# Patient Record
Sex: MALE | Race: WHITE
[De-identification: names, ages, dates, MRNs, and addresses within clinical notes are randomized per-mention and may not be internally consistent; named-entity substitution may affect disease eponyms.]

---

## 2018-03-04 ENCOUNTER — HOSPITAL ENCOUNTER (EMERGENCY)
Dept: HOSPITAL 45 - C.EDB | Age: 28
LOS: 1 days | Discharge: HOME | End: 2018-03-05
Payer: COMMERCIAL

## 2018-03-04 VITALS
BODY MASS INDEX: 27.06 KG/M2 | WEIGHT: 158.51 LBS | BODY MASS INDEX: 27.06 KG/M2 | HEIGHT: 64.02 IN | HEIGHT: 64.02 IN | WEIGHT: 158.51 LBS

## 2018-03-04 VITALS — TEMPERATURE: 98.42 F

## 2018-03-04 DIAGNOSIS — Z82.49: ICD-10-CM

## 2018-03-04 DIAGNOSIS — F41.9: Primary | ICD-10-CM

## 2018-03-04 DIAGNOSIS — F32.9: ICD-10-CM

## 2018-03-04 DIAGNOSIS — Z88.1: ICD-10-CM

## 2018-03-04 LAB
ALBUMIN SERPL-MCNC: 4.1 GM/DL (ref 3.4–5)
ALP SERPL-CCNC: 88 U/L (ref 45–117)
ALT SERPL-CCNC: 28 U/L (ref 12–78)
AST SERPL-CCNC: 19 U/L (ref 15–37)
BASOPHILS # BLD: 0.03 K/UL (ref 0–0.2)
BASOPHILS NFR BLD: 0.3 %
BUN SERPL-MCNC: 19 MG/DL (ref 7–18)
CALCIUM SERPL-MCNC: 8.7 MG/DL (ref 8.5–10.1)
CO2 SERPL-SCNC: 29 MMOL/L (ref 21–32)
CREAT SERPL-MCNC: 1.07 MG/DL (ref 0.6–1.4)
EOS ABS #: 0.07 K/UL (ref 0–0.5)
EOSINOPHIL NFR BLD AUTO: 279 K/UL (ref 130–400)
GLUCOSE SERPL-MCNC: 86 MG/DL (ref 70–99)
HCT VFR BLD CALC: 51 % (ref 42–52)
HGB BLD-MCNC: 17.7 G/DL (ref 14–18)
IG#: 0.17 K/UL (ref 0–0.02)
IMM GRANULOCYTES NFR BLD AUTO: 30.2 %
LYMPHOCYTES # BLD: 2.81 K/UL (ref 1.2–3.4)
MCH RBC QN AUTO: 29.8 PG (ref 25–34)
MCHC RBC AUTO-ENTMCNC: 34.7 G/DL (ref 32–36)
MCV RBC AUTO: 86 FL (ref 80–100)
MONO ABS #: 0.92 K/UL (ref 0.11–0.59)
MONOCYTES NFR BLD: 9.9 %
NEUT ABS #: 5.32 K/UL (ref 1.4–6.5)
NEUTROPHILS # BLD AUTO: 0.8 %
NEUTROPHILS NFR BLD AUTO: 57 %
PMV BLD AUTO: 8.8 FL (ref 7.4–10.4)
POTASSIUM SERPL-SCNC: 3 MMOL/L (ref 3.5–5.1)
PROT SERPL-MCNC: 8.2 GM/DL (ref 6.4–8.2)
RED CELL DISTRIBUTION WIDTH CV: 13.5 % (ref 11.5–14.5)
RED CELL DISTRIBUTION WIDTH SD: 42.2 FL (ref 36.4–46.3)
SODIUM SERPL-SCNC: 137 MMOL/L (ref 136–145)
WBC # BLD AUTO: 9.32 K/UL (ref 4.8–10.8)

## 2018-03-04 NOTE — EMERGENCY ROOM VISIT NOTE
History


Report prepared by Noe:  Gustabo Young


Under the Supervision of:  Dr. Radha Epps D.O.


First contact with patient:  22:51


Chief Complaint:  MENTAL HEALTH EVALUATION


Stated Complaint:  MENTAL HEALTH EVAL





History of Present Illness


The patient is a 28 year old male who presents to the Emergency Room with 

complaints of an episode of possible suicidal ideations occurring within the 

last two weeks. The patient states that he recently stopped taking his Seroquel 

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 He notes that he 

completely stopped taking his medication because they make him sleep too much 

and feel lethargic throughout the day, preventing him from doing his work. He 

reports that since he stopped taking his medication, he has had trouble 

sleeping. The patient states that he is in the emergency department tonight 

because his friends believed that he was suicidal. He notes that he is not 

suicidal, but reports that he is "isolating himself" because he does not feel 

like he is a positive influence in other's lives. He reports that he feels as 

though his friends feel that he is a positive influence, but states that what 

his friends think is not important because he does not feel the same. He notes 

that he does not have any current suicidal ideations, but reports that he has 

had suicidal ideations in the past. The patient states that a few years ago, he 

had suicidal ideations and held a knife to his wrist. He notes that he had two 

other suicidal ideations when he was in 7th grade where he held a knife to his 

throat. He denies having any homicidal ideations and hallucinations as well as 

headache, change in his bowel movements, as well as any other physical 

concerns. He reports that he has a therapist and is in group therapy, which has 

mildly helped. The patient states that he had an ear infection last month that 

has resolved.





   Source of History:  patient


   Onset:  within the last two weeks


   Position:  other (global)


   Quality:  other (possible suicidal ideations)


   Timing:  other (an episode)


   Associated Symptoms:  No headache


Note:


The patient states that he is "isolating himself." He  denies any current 

suicidal ideations, homicidal ideations, and hallucinations. He also denies any 

change in his bowel movements.





Review of Systems


See HPI for pertinent positives & negatives. A total of 10 systems reviewed and 

were otherwise negative.





Past Medical & Surgical


Medical Problems:


(1) Depression








Family History





Heart disease


Hypertension





Social History


Smoking Status:  Never Smoker


Alcohol Use:  none


Drug Use:  none


Marital Status:  single


Housing Status:  lives alone


Occupation Status:  [employer] student





Current/Historical Medications


Scheduled


Levothyroxine Sodium (Levothyroxine Sodium), 75 MCG PO QAM


[place] Carbonate (Lithium Carbonate), 900 MG PO HS


Quetiapine Fumarate (Seroquel), 50 MG PO HS





Allergies


Coded Allergies:  


     Cefaclor (Verified  Allergy, Unknown, vomiting, 3/4/18)





Physical Exam


Vital Signs











  Date Time  Temp Pulse Resp B/P (MAP) Pulse Ox O2 Delivery O2 Flow Rate FiO2


 


3/5/18 01:41  62 16 144/93 98 Room Air  


 


3/4/18 22:14 36.9 74 18 153/104 99 Room Air  











Physical Exam


GENERAL: alert, well appearing, well nourished, no distress, non-toxic 


EYE EXAM: normal conjunctiva, PERRL and EOM's grossly intact


OROPHARYNX: no exudate, no erythema, lips, buccal mucosa, and tongue normal and 

mucous membranes are moist


NECK: supple, no nuchal rigidity, no adenopathy, non-tender


LUNGS: Clear to auscultation. Normal chest wall mechanics


HEART: no murmurs, S1 normal and S2 normal 


ABDOMEN: abdomen soft, non-tender, normo-active bowel sounds, no masses, no 

rebound or guarding. 


BACK: Back is symmetrical on inspection and there is no deformity, no midline 

tenderness, no CVA tenderness. 


SKIN: no rashes and no bruising 


UPPER EXTREMITIES: upper extremities are grossly normal. 


LOWER EXTREMITIES: No pitting edema.


NEURO EXAM: Normal sensorium, cranial nerves II-XII grossly intact, normal 

speech,  no gross weakness of arms, no gross weakness of legs.


PSYCH: No active SI but history of SI, no HI, no hallucinations.





Medical Decision & Procedures


Laboratory Results


3/4/18 22:27








Red Blood Count 5.93, Mean Corpuscular Volume 86.0, Mean Corpuscular Hemoglobin 

29.8, Mean Corpuscular Hemoglobin Concent 34.7, Mean Platelet Volume 8.8, 

Neutrophils (%) (Auto) 57.0, Lymphocytes (%) (Auto) 30.2, Monocytes (%) (Auto) 

9.9, Eosinophils (%) (Auto) 0.8, Basophils (%) (Auto) 0.3, Neutrophils # (Auto) 

5.32, Lymphocytes # (Auto) 2.81, Monocytes # (Auto) 0.92, Eosinophils # (Auto) 

0.07, Basophils # (Auto) 0.03





3/4/18 22:27

















Test


  3/4/18


22:25 3/4/18


22:27


 


Urine Color YELLOW  


 


Urine Appearance CLEAR (CLEAR)  


 


Urine pH 6.0 (4.5-7.5)  


 


Urine Specific Gravity


  1.015


(1.000-1.030) 


 


 


Urine Protein NEG (NEG)  


 


Urine Glucose (UA) NEG (NEG)  


 


Urine Ketones NEG (NEG)  


 


Urine Occult Blood NEG (NEG)  


 


Urine Nitrite NEG (NEG)  


 


Urine Bilirubin NEG (NEG)  


 


Urine Urobilinogen NEG (NEG)  


 


Urine Leukocyte Esterase NEG (NEG)  


 


Urine Opiates Screen NEG (NEG)  


 


Urine Methadone, Qualitative NEG (NEG)  


 


Urine Barbiturates NEG (NEG)  


 


Urine Phencyclidine (PCP)


Level NEG (NEG) 


  


 


 


Ur


Amphetamine/Methamphetamine NEG (NEG) 


  


 


 


MDMA (Ecstasy) Screen NEG (NEG)  


 


Urine Benzodiazepines Screen NEG (NEG)  


 


Urine Cocaine Metabolite NEG (NEG)  


 


Urine Marijuana (THC) NEG (NEG)  


 


White Blood Count


  


  9.32 K/uL


(4.8-10.8)


 


Red Blood Count


  


  5.93 M/uL


(4.7-6.1)


 


Hemoglobin


  


  17.7 g/dL


(14.0-18.0)


 


Hematocrit  51.0 % (42-52) 


 


Mean Corpuscular Volume


  


  86.0 fL


()


 


Mean Corpuscular Hemoglobin


  


  29.8 pg


(25-34)


 


Mean Corpuscular Hemoglobin


Concent 


  34.7 g/dl


(32-36)


 


Platelet Count


  


  279 K/uL


(130-400)


 


Mean Platelet Volume


  


  8.8 fL


(7.4-10.4)


 


Neutrophils (%) (Auto)  57.0 % 


 


Lymphocytes (%) (Auto)  30.2 % 


 


Monocytes (%) (Auto)  9.9 % 


 


Eosinophils (%) (Auto)  0.8 % 


 


Basophils (%) (Auto)  0.3 % 


 


Neutrophils # (Auto)


  


  5.32 K/uL


(1.4-6.5)


 


Lymphocytes # (Auto)


  


  2.81 K/uL


(1.2-3.4)


 


Monocytes # (Auto)


  


  0.92 K/uL


(0.11-0.59)


 


Eosinophils # (Auto)


  


  0.07 K/uL


(0-0.5)


 


Basophils # (Auto)


  


  0.03 K/uL


(0-0.2)


 


RDW Standard Deviation


  


  42.2 fL


(36.4-46.3)


 


RDW Coefficient of Variation


  


  13.5 %


(11.5-14.5)


 


Immature Granulocyte % (Auto)  1.8 % 


 


Immature Granulocyte # (Auto)


  


  0.17 K/uL


(0.00-0.02)


 


Anion Gap


  


  8.0 mmol/L


(3-11)


 


Est Creatinine Clear Calc


Drug Dose 


  92.6 ml/min 


 


 


Estimated GFR (


American) 


  108.1 


 


 


Estimated GFR (Non-


American 


  93.3 


 


 


BUN/Creatinine Ratio  17.7 (10-20) 


 


Calcium Level


  


  8.7 mg/dl


(8.5-10.1)


 


Total Bilirubin


  


  0.3 mg/dl


(0.2-1)


 


Aspartate Amino Transf


(AST/SGOT) 


  19 U/L (15-37) 


 


 


Alanine Aminotransferase


(ALT/SGPT) 


  28 U/L (12-78) 


 


 


Alkaline Phosphatase


  


  88 U/L


()


 


Total Protein


  


  8.2 gm/dl


(6.4-8.2)


 


Albumin


  


  4.1 gm/dl


(3.4-5.0)


 


Globulin


  


  4.1 gm/dl


(2.5-4.0)


 


Albumin/Globulin Ratio  1.0 (0.9-2) 


 


Thyroid Stimulating Hormone


(TSH) 


  4.100 uIu/ml


(0.300-4.500)


 


Salicylates Level


  


  < 1.7 mg/dl


(2.8-20)


 


Acetaminophen Level


  


  < 2 ug/ml


(10-30)


 


Lithium Level


  


  < 0.2 mMOL/L


(0.6-1.2)


 


Ethyl Alcohol mg/dL


  


  < 3.0 mg/dl


(0-3)





Laboratory results per my review.





ED Course


2255: The patient was evaluated in room A6. A complete history and physical 

exam was performed. 





0145: Patient seen and evaluated by Rachna from [facility] S.  Patient does not meet 

inpatient criteria, and no grounds to 302 the patient.  Patient already has a 

group counselor and therapist whom he sees.  Discussed with him concerning 

symptoms, need for close follow-up with his therapist, possible medication 

adjustments after they are reinitiated, symptoms to watch and return for, he 

verbalized understanding was agreeable with plan.  Patient cooperative here 

throughout, I do not feel he is an imminent danger to himself or others at this 

time.





0221: Upon reevaluation, the patient is feeling better. I discussed the 

findings and the treatment plan with the patient.  He verbalizes agreement and 

understanding.  The patient was discharged home.





Medical Decision


Differential diagnosis:





Etiologies such as mood disorder, infection, hypoglycemia, electrolyte 

abnormalities, cardiac sources, intracerebral event, toxicologic, neurologic, 

as well as others were entertained.





Blood Pressure Screening


Patient's blood pressure:  Elevated blood pressure


Blood pressure disposition:  Elevated BP felt to be situational





Impression





 Primary Impression:  


 Acute anxiety


 Additional Impression:  


 Depression





Scribe Attestation


The scribe's documentation has been prepared under my direction and personally 

reviewed by me in its entirety. I confirm that the note above accurately 

reflects all work, treatment, procedures, and medical decision making performed 

by me.





Departure Information


Dispostion


Home / Self-Care





Referrals


No Doctor, Assigned (PCP)





Forms


HOME CARE DOCUMENTATION FORM,                                                 

               IMPORTANT VISIT INFORMATION





Patient Instructions


My [facility]





Additional Instructions





Please follow-up as directed by the  with your therapist.  Please 

consider discussion with your doctor of your prior medications however at 

slightly lower doses.  If you have any increased symptoms of feeling sad or 

nervous, have thoughts again about wanting to hurt yourself, feel you are 

unable to handle additional stress, you have any other new concerns, please 

return to the emergency room.





Problem Qualifiers








 Additional Impression:  


 Depression


 Depression Type:  unspecified  Qualified Codes:  F32.9 - Major depressive 

disorder, single episode, unspecified

## 2018-03-05 VITALS — HEART RATE: 62 BPM | DIASTOLIC BLOOD PRESSURE: 93 MMHG | OXYGEN SATURATION: 98 % | SYSTOLIC BLOOD PRESSURE: 144 MMHG
